# Patient Record
Sex: FEMALE | Race: WHITE | Employment: UNEMPLOYED | URBAN - METROPOLITAN AREA
[De-identification: names, ages, dates, MRNs, and addresses within clinical notes are randomized per-mention and may not be internally consistent; named-entity substitution may affect disease eponyms.]

---

## 2017-02-06 RX ORDER — CITALOPRAM 20 MG/1
TABLET ORAL
Qty: 30 TABLET | Refills: 1 | Status: SHIPPED | OUTPATIENT
Start: 2017-02-06 | End: 2017-04-08 | Stop reason: SDUPTHER

## 2017-03-03 RX ORDER — SUMATRIPTAN 50 MG/1
TABLET, FILM COATED ORAL
Qty: 9 TABLET | Refills: 0 | Status: SHIPPED | OUTPATIENT
Start: 2017-03-03 | End: 2017-04-02 | Stop reason: SDUPTHER

## 2017-03-17 ENCOUNTER — OFFICE VISIT (OUTPATIENT)
Dept: FAMILY MEDICINE CLINIC | Age: 22
End: 2017-03-17
Payer: MEDICARE

## 2017-03-17 VITALS
BODY MASS INDEX: 21.18 KG/M2 | DIASTOLIC BLOOD PRESSURE: 84 MMHG | HEIGHT: 69 IN | WEIGHT: 143 LBS | SYSTOLIC BLOOD PRESSURE: 110 MMHG | OXYGEN SATURATION: 98 % | HEART RATE: 86 BPM | TEMPERATURE: 99.6 F

## 2017-03-17 DIAGNOSIS — B35.3 TINEA PEDIS OF BOTH FEET: Primary | ICD-10-CM

## 2017-03-17 PROCEDURE — 99213 OFFICE O/P EST LOW 20 MIN: CPT | Performed by: NURSE PRACTITIONER

## 2017-03-17 RX ORDER — CLOTRIMAZOLE 1 %
CREAM (GRAM) TOPICAL
Qty: 1 TUBE | Refills: 0 | Status: SHIPPED | OUTPATIENT
Start: 2017-03-17 | End: 2017-03-24

## 2017-03-17 ASSESSMENT — ENCOUNTER SYMPTOMS
COUGH: 0
VOMITING: 0
SHORTNESS OF BREATH: 0
SORE THROAT: 0
DIARRHEA: 0
NAIL CHANGES: 0
EYE PAIN: 0
RHINORRHEA: 0

## 2017-04-03 RX ORDER — SUMATRIPTAN 50 MG/1
TABLET, FILM COATED ORAL
Qty: 9 TABLET | Refills: 0 | Status: SHIPPED | OUTPATIENT
Start: 2017-04-03 | End: 2017-05-17 | Stop reason: SDUPTHER

## 2017-04-04 RX ORDER — SUMATRIPTAN 50 MG/1
TABLET, FILM COATED ORAL
Qty: 9 TABLET | Refills: 0 | OUTPATIENT
Start: 2017-04-04

## 2017-05-17 RX ORDER — SUMATRIPTAN 50 MG/1
TABLET, FILM COATED ORAL
Qty: 9 TABLET | Refills: 0 | Status: SHIPPED | OUTPATIENT
Start: 2017-05-17 | End: 2017-06-21 | Stop reason: SDUPTHER

## 2017-06-21 RX ORDER — SUMATRIPTAN 50 MG/1
TABLET, FILM COATED ORAL
Qty: 9 TABLET | Refills: 0 | Status: SHIPPED | OUTPATIENT
Start: 2017-06-21 | End: 2017-07-22 | Stop reason: SDUPTHER

## 2017-07-24 RX ORDER — SUMATRIPTAN 50 MG/1
TABLET, FILM COATED ORAL
Qty: 9 TABLET | Refills: 0 | OUTPATIENT
Start: 2017-07-24

## 2017-07-24 RX ORDER — CITALOPRAM 20 MG/1
TABLET ORAL
Qty: 30 TABLET | Refills: 2 | OUTPATIENT
Start: 2017-07-24

## 2017-07-24 RX ORDER — SUMATRIPTAN 50 MG/1
TABLET, FILM COATED ORAL
Qty: 9 TABLET | Refills: 0 | Status: SHIPPED | OUTPATIENT
Start: 2017-07-24 | End: 2017-08-22 | Stop reason: SDUPTHER

## 2017-08-22 RX ORDER — CITALOPRAM 20 MG/1
20 TABLET ORAL DAILY
Qty: 30 TABLET | Refills: 0 | Status: SHIPPED | OUTPATIENT
Start: 2017-08-22 | End: 2017-09-26 | Stop reason: SDUPTHER

## 2017-08-22 RX ORDER — SUMATRIPTAN 50 MG/1
50 TABLET, FILM COATED ORAL
Qty: 9 TABLET | Refills: 0 | Status: SHIPPED | OUTPATIENT
Start: 2017-08-22 | End: 2017-10-03 | Stop reason: SDUPTHER

## 2017-09-26 RX ORDER — CITALOPRAM 20 MG/1
20 TABLET ORAL DAILY
Qty: 30 TABLET | Refills: 0 | Status: SHIPPED | OUTPATIENT
Start: 2017-09-26 | End: 2017-10-03 | Stop reason: SDUPTHER

## 2017-10-03 ENCOUNTER — OFFICE VISIT (OUTPATIENT)
Dept: FAMILY MEDICINE CLINIC | Age: 22
End: 2017-10-03
Payer: MEDICARE

## 2017-10-03 VITALS
TEMPERATURE: 98.4 F | HEIGHT: 69 IN | WEIGHT: 145.3 LBS | HEART RATE: 91 BPM | DIASTOLIC BLOOD PRESSURE: 82 MMHG | OXYGEN SATURATION: 99 % | BODY MASS INDEX: 21.52 KG/M2 | SYSTOLIC BLOOD PRESSURE: 114 MMHG

## 2017-10-03 DIAGNOSIS — F34.1 DYSTHYMIA: ICD-10-CM

## 2017-10-03 DIAGNOSIS — G43.009 MIGRAINE WITHOUT AURA AND WITHOUT STATUS MIGRAINOSUS, NOT INTRACTABLE: Primary | ICD-10-CM

## 2017-10-03 DIAGNOSIS — G89.29 CHRONIC BILATERAL BACK PAIN, UNSPECIFIED BACK LOCATION: ICD-10-CM

## 2017-10-03 DIAGNOSIS — M54.9 CHRONIC BILATERAL BACK PAIN, UNSPECIFIED BACK LOCATION: ICD-10-CM

## 2017-10-03 PROCEDURE — 99214 OFFICE O/P EST MOD 30 MIN: CPT | Performed by: NURSE PRACTITIONER

## 2017-10-03 RX ORDER — SUMATRIPTAN 50 MG/1
50 TABLET, FILM COATED ORAL
Qty: 9 TABLET | Refills: 1 | Status: SHIPPED | OUTPATIENT
Start: 2017-10-03 | End: 2017-10-26 | Stop reason: SDUPTHER

## 2017-10-03 RX ORDER — CITALOPRAM 20 MG/1
20 TABLET ORAL DAILY
Qty: 30 TABLET | Refills: 3 | Status: SHIPPED | OUTPATIENT
Start: 2017-10-03 | End: 2018-03-09 | Stop reason: SDUPTHER

## 2017-10-03 ASSESSMENT — ENCOUNTER SYMPTOMS
BLOOD IN STOOL: 0
COUGH: 0
ABDOMINAL PAIN: 0
EYE DISCHARGE: 0
BACK PAIN: 1
SHORTNESS OF BREATH: 0

## 2017-10-03 ASSESSMENT — PATIENT HEALTH QUESTIONNAIRE - PHQ9
SUM OF ALL RESPONSES TO PHQ9 QUESTIONS 1 & 2: 0
SUM OF ALL RESPONSES TO PHQ QUESTIONS 1-9: 0
1. LITTLE INTEREST OR PLEASURE IN DOING THINGS: 0
2. FEELING DOWN, DEPRESSED OR HOPELESS: 0

## 2017-10-03 NOTE — MR AVS SNAPSHOT
After Visit Summary             Yadi Conley   10/3/2017 2:15 PM   Office Visit    Description:  Female : 1995   Provider:  Bambi Stoll NP   Department:  [facility] and Future Appointments         Below is a list of your follow-up and future appointments. This may not be a complete list as you may have made appointments directly with providers that we are not aware of or your providers may have made some for you. Please call your providers to confirm appointments. It is important to keep your appointments. Please bring your current insurance card, photo ID, co-pay, and all medication bottles to your appointment. If self-pay, payment is expected at the time of service. Your To-Do List     Future Appointments Provider Department Dept Phone    2017 9:00 AM Bambi Stoll NP [facility] [phone]    4/3/2018 8:30 AM Bambi Stoll NP [facility] [phone]    Please arrive 15 minutes prior to appointment, bring photo ID and insurance card. Follow-Up    Return in about 6 months (around 4/3/2018) for Depression. Information from Your Visit        Department     Name Address Phone Fax    [facility] [address] [place], [place] [id number] [phone]      You Were Seen for:         Comments    Migraine without aura and without status migrainosus, not intractable   [[id number]]         Vital Signs     Blood Pressure Pulse Temperature Height Weight Last Menstrual Period    114/82 91 98.4 °F (36.9 °C) (Oral) 5' 9\" (1.753 m) 145 lb 4.8 oz (65.9 kg) 2017 (Exact Date)    Oxygen Saturation Body Mass Index Smoking Status             99% 21.46 kg/m2 Former Smoker         Instructions         Painful Sex: Care Instructions  Your Care Instructions  Painful sex can be caused by many things.  You may have an injury, an account. Enter P196 in the [facility] box to learn more about \"Painful Sex: Care Instructions. \"     If you do not have an account, please click on the \"Sign Up Now\" link. Current as of: October 13, 2016  Content Version: 11.3  © 6031-4703 [employer]. Care instructions adapted under license by [facility] ([place] [facility]). If you have questions about a medical condition or this instruction, always ask your healthcare professional. [address] any warranty or liability for your use of this information.               Where to Get Your Medications      These medications were sent to [facility] [id number] [facility], [address] [facility]  SKYLAR [facility], [address] [address]     Phone:  [phone]     citalopram 20 MG tablet    SUMAtriptan 50 MG tablet         Your Current Medications Are              citalopram (CELEXA) 20 MG tablet Take 1 tablet by mouth daily    SUMAtriptan (IMITREX) 50 MG tablet Take 1 tablet by mouth once as needed for Migraine    norgestrel-ethinyl estradiol (ELINEST) 0.3-30 MG-MCG per tablet Take 1 tablet by mouth daily    cyclobenzaprine (FLEXERIL) 10 MG tablet Take 10 mg by mouth 3 times daily as needed for Muscle spasms    naproxen (NAPROSYN) 500 MG tablet Take 1 tablet by mouth 2 times daily (with meals)      Allergies           No Known Allergies         Additional Information        Basic Information     Date Of Birth Sex Race Ethnicity Preferred Language    1995 Female White Non-/Non  English      Problem List as of 10/3/2017  Date Reviewed: 10/3/2017                Chronic bilateral back pain    Internal hemorrhoid    Migraine without aura and without status migrainosus, not intractable    Dysthymia    Family planning, BCP (birth control pills) maintenance    Rectal bleeding    Routine Papanicolaou smear    Back pain      Immunizations as of 10/3/2017     Name Date HPV Gardasil Quadrivalent 2/5/2010, 9/21/2009, 7/20/2009    Tdap (Boostrix, Adacel) 11/4/2016      Preventive Care        Date Due    HIV screening is recommended for all people regardless of risk factors  aged 15-65 years at least once (lifetime) who have never been HIV tested. 5/19/2008    Yearly Flu Vaccine (1) 11/4/2017 (Originally 9/1/2017)    Pap Smear 9/29/2019    Colonoscopy 10/12/2026    Tetanus Combination Vaccine (2 - Td) 11/4/2026            [employer] Signup           Our records indicate that you have an active [employer] account. You can view your After Visit Summary by going to [email or web address] and logging in with your [employer] username and password. If you don't have a [employer] username and password but a parent or guardian has access to your record, the parent or guardian should login with their own [employer] username and password and access your record to view the After Visit Summary. Additional Information  If you have questions, please contact the physician practice where you receive care. Remember, [employer] is NOT to be used for urgent needs. For medical emergencies, dial 911. For questions regarding your [employer] account call [phone]. If you have a clinical question, please call your doctor's office.

## 2017-10-03 NOTE — PROGRESS NOTES
[facility] [address] [place] [id number]  Dept: [phone]  Dept Fax: [phone]    Kvng Shaw is a 23 y.o. female who presents today for her medical conditions/complaints as noted below.   Kvng Shaw is c/o of Medication Refill and Medication Check        HPI:     HPI Comments: Patient presents with:  Medication Refill, feels much better on celexa .using imtrex for migraines   Medication Check  Got injections in back I [facility] PM ; they want to look for underlying issues , believes OA/poss slipped disk, avoidance of steroid to avoid Osteopor        Past Medical History:   Diagnosis Date    Chronic back pain     Contact dermatitis and other eczema due to other chemical products     Contact dermatitis and other eczema due to other chemical products     Depression     Displacement of lumbar intervertebral disc without myelopathy     Dizziness and giddiness     Other general counseling and advice for contraceptive management     Other malaise and fatigue     Syncope and collapse       Past Surgical History:   Procedure Laterality Date    COLONOSCOPY  10/12/2016    internal hemorrhoids and mild proctitis        Family History   Problem Relation Age of Onset    Cancer Father      leukemia       Social History   Substance Use Topics    Smoking status: Former Smoker     Quit date: 10/1/2015    Smokeless tobacco: Never Used    Alcohol use 0.0 oz/week     0 Standard drinks or equivalent per week      Comment: occ      Current Outpatient Prescriptions   Medication Sig Dispense Refill    citalopram (CELEXA) 20 MG tablet Take 1 tablet by mouth daily 30 tablet 3    SUMAtriptan (IMITREX) 50 MG tablet Take 1 tablet by mouth once as needed for Migraine 9 tablet 1    norgestrel-ethinyl estradiol (ELINEST) 0.3-30 MG-MCG per tablet Take 1 tablet by mouth daily 1 packet 11    cyclobenzaprine (FLEXERIL) 10 MG tablet Take 10 mg by mouth 3 times daily as needed for Muscle spasms      naproxen (NAPROSYN) 500 MG tablet Take 1 tablet by mouth 2 times daily (with meals) 60 tablet 3     No current facility-administered medications for this visit. No Known Allergies      Subjective:      Review of Systems   Constitutional: Negative for activity change, chills, fatigue and fever. Eyes: Negative for discharge and visual disturbance. Respiratory: Negative for cough and shortness of breath. Cardiovascular: Negative for chest pain and leg swelling. Gastrointestinal: Negative for abdominal pain and blood in stool. Endocrine: Negative for cold intolerance and heat intolerance. Genitourinary: Negative for dysuria and flank pain. Musculoskeletal: Positive for back pain (sees PM; gets injections ). Negative for joint swelling and myalgias. Skin: Negative for pallor and rash. Neurological: Positive for headaches (assoc with periods ). Negative for dizziness. Psychiatric/Behavioral: Positive for dysphoric mood (controlled with med ). Negative for hallucinations and suicidal ideas. Objective:     Physical Exam   Constitutional: She is oriented to person, place, and time. She appears well-developed and well-nourished. /82  Pulse 91  Temp 98.4 °F (36.9 °C) (Oral)   Ht 5' 9\" (1.753 m)  Wt 145 lb 4.8 oz (65.9 kg)  LMP 09/19/2017 (Exact Date)  SpO2 99%  BMI 21.46 kg/m2     HENT:   Head: Normocephalic and atraumatic. Eyes: Conjunctivae and EOM are normal. No scleral icterus. Neck: Neck supple. Cardiovascular: Normal rate, regular rhythm, normal heart sounds and intact distal pulses. Pulmonary/Chest: Effort normal and breath sounds normal. She has no wheezes. She has no rales. Abdominal: Soft. Bowel sounds are normal.   Musculoskeletal: Normal range of motion. She exhibits no edema. Neurological: She is alert and oriented to person, place, and time. Skin: Skin is warm and dry. Psychiatric: She has a normal mood and affect.

## 2017-10-12 DIAGNOSIS — Z30.41 FAMILY PLANNING, BCP (BIRTH CONTROL PILLS) MAINTENANCE: ICD-10-CM

## 2017-10-12 NOTE — TELEPHONE ENCOUNTER
Next Visit Date:  Future Appointments  Date Time Provider Morgan Trujilloi   11/7/2017 9:00 AM ANDREE Dougherty Refugio [facility]   4/3/2018 8:30 AM Rashad Rudd NP [address] Maintenance   Topic Date Due    HIV screen  05/19/2008    Chlamydia screen  09/29/2017    Flu vaccine (1) 11/04/2017 (Originally 9/1/2017)    Cervical cancer screen  09/29/2019    Colon cancer screen colonoscopy  10/12/2026    DTaP/Tdap/Td vaccine (2 - Td) 11/04/2026       No results found for: LABA1C          ( goal A1C is < 7)   No results found for: LABMICR  No results found for: LDLCHOLESTEROL, LDLCALC    (goal LDL is <100)   AST (U/L)   Date Value   09/22/2016 22     ALT (U/L)   Date Value   09/22/2016 18     BUN (mg/dL)   Date Value   09/22/2016 12     BP Readings from Last 3 Encounters:   10/03/17 114/82   03/17/17 110/84   11/04/16 108/70          (goal 120/80)    All Future Testing planned in [facility]  Lab Frequency Next Occurrence   GYN Cytology Once 09/29/2018   VAGINITIS DNA PROBE Once 09/29/2018   C. Trachomatis / N.  Gonorrhoeae, DNA Probe Once 09/29/2018               Patient Active Problem List:     Back pain     Routine Papanicolaou smear     Rectal bleeding     Internal hemorrhoid     Migraine without aura and without status migrainosus, not intractable     Dysthymia     Family planning, BCP (birth control pills) maintenance     Chronic bilateral back pain

## 2017-10-16 RX ORDER — NORGESTREL AND ETHINYL ESTRADIOL 0.3-0.03MG
KIT ORAL
Qty: 28 TABLET | Refills: 10 | Status: SHIPPED | OUTPATIENT
Start: 2017-10-16 | End: 2018-08-07 | Stop reason: SDUPTHER

## 2017-10-26 DIAGNOSIS — G43.009 MIGRAINE WITHOUT AURA AND WITHOUT STATUS MIGRAINOSUS, NOT INTRACTABLE: ICD-10-CM

## 2017-10-26 DIAGNOSIS — F34.1 DYSTHYMIA: ICD-10-CM

## 2017-10-26 NOTE — TELEPHONE ENCOUNTER
From: Patricia Estrada  Sent: 10/26/2017 8:59 AM EDT  Subject: Medication Renewal Request    Patricia Estrada would like a refill of the following medications:  citalopram (CELEXA) 20 MG tablet Irving Orozco NP]  SUMAtriptan (IMITREX) 50 MG tablet Irving Orozco NP]    Preferred pharmacy: [address] [place]    Comment:

## 2017-10-27 RX ORDER — SUMATRIPTAN 50 MG/1
50 TABLET, FILM COATED ORAL
Qty: 9 TABLET | Refills: 0 | Status: SHIPPED | OUTPATIENT
Start: 2017-10-27 | End: 2018-01-29 | Stop reason: SDUPTHER

## 2017-10-27 RX ORDER — CITALOPRAM 20 MG/1
20 TABLET ORAL DAILY
Qty: 30 TABLET | Refills: 3 | OUTPATIENT
Start: 2017-10-27

## 2017-11-02 DIAGNOSIS — Z30.41 FAMILY PLANNING, BCP (BIRTH CONTROL PILLS) MAINTENANCE: ICD-10-CM

## 2017-11-02 DIAGNOSIS — G43.009 MIGRAINE WITHOUT AURA AND WITHOUT STATUS MIGRAINOSUS, NOT INTRACTABLE: ICD-10-CM

## 2017-11-02 NOTE — TELEPHONE ENCOUNTER
From: Mega Womack  Sent: 11/2/2017 9:02 AM EDT  Subject: Medication Renewal Request    Mega Womack would like a refill of the following medications:  ELINEST 0.3-30 MG-MCG per tablet Mandi Barajas NP]  SUMAtriptan (IMITREX) 50 MG tablet Mandi Barajas NP]    Preferred pharmacy: [facility] [address]    Comment:

## 2017-11-03 RX ORDER — SUMATRIPTAN 50 MG/1
50 TABLET, FILM COATED ORAL
Qty: 9 TABLET | Refills: 0 | OUTPATIENT
Start: 2017-11-03 | End: 2017-11-03

## 2017-11-21 ENCOUNTER — HOSPITAL ENCOUNTER (OUTPATIENT)
Age: 22
Setting detail: SPECIMEN
Discharge: HOME | End: 2017-11-21
Payer: MEDICARE

## 2017-11-21 ENCOUNTER — OFFICE VISIT (OUTPATIENT)
Dept: FAMILY MEDICINE CLINIC | Age: 22
End: 2017-11-21
Payer: MEDICARE

## 2017-11-21 VITALS
BODY MASS INDEX: 20.73 KG/M2 | DIASTOLIC BLOOD PRESSURE: 80 MMHG | OXYGEN SATURATION: 99 % | SYSTOLIC BLOOD PRESSURE: 111 MMHG | HEIGHT: 69 IN | TEMPERATURE: 99 F | WEIGHT: 140 LBS | HEART RATE: 86 BPM

## 2017-11-21 DIAGNOSIS — N89.8 VAGINAL DISCHARGE: Primary | ICD-10-CM

## 2017-11-21 DIAGNOSIS — Z12.4 CERVICAL CANCER SCREENING: ICD-10-CM

## 2017-11-21 DIAGNOSIS — N89.8 VAGINAL DISCHARGE: ICD-10-CM

## 2017-11-21 LAB
DIRECT EXAM: ABNORMAL
Lab: ABNORMAL
SPECIMEN DESCRIPTION: ABNORMAL
STATUS: ABNORMAL

## 2017-11-21 PROCEDURE — 99395 PREV VISIT EST AGE 18-39: CPT | Performed by: NURSE PRACTITIONER

## 2017-11-21 NOTE — PROGRESS NOTES
allergies indicates no known allergies. Patient Active Problem List   Diagnosis    Back pain    Routine Papanicolaou smear    Rectal bleeding    Internal hemorrhoid    Migraine without aura and without status migrainosus, not intractable    Dysthymia    Family planning, BCP (birth control pills) maintenance    Chronic bilateral back pain       REVIEW OF SYSTEMS:        A minimum of an eleven point review of systems was completed and found to be negative except for the pertinent positives found below. Constitutional: No fever, chills or malaise;  fatigue  Head and Eyes: No  Headache, Dizziness or trauma in last 12 months  ENT ROS: No hearing, Tinnitis, sinus problems  Hematological and Lymphatic ROS : no gland swelling , no h/o Bleeding   Psych ROS: No Depression,  or anxiety  Breast ROS: No prior breast abnormalities or lumps  Respiratory ROS: No SOB,,Cough,   Cardiovascular ROS: No Chest Pain with Exertion, Palpitations, Syncope, Edema, Arrhythmia  Gastrointestinal ROS: No Indigestion, Heartburn, Nausea, vomiting, Diahrea, Constipation,or Bowel Changes; No Bloody Stools or melena  Genito-Urinary ROS: No Dysuria, Hematuria or Nocturia. No Urinary Incontinence o+ daily clear Vaginal Discharge. Dryness and pain with intercourse, t/o history of sexual intercourse .    Musculoskeletal ROS: No Arthralgia,or Rheumatism  Neurological ROS: No CVA, Migraines, Epilepsy, Seizure Hx, or Limb Weakness  Dermatological ROS: No Rash, Itching, Hives, Mole Changes                                                                                                                                                                                                                           PHYSICAL Exam:     Constitutional:  Blood pressure 111/80, pulse 86, temperature 99 °F (37.2 °C), temperature source Oral, height 5' 9\" (1.753 m), weight 140 lb (63.5 kg), last menstrual period 11/07/2017, SpO2 99 %, not currently breastfeeding. General Appearance: This  is a well Developed, well Nourished, well groomed female. Her BMI was reviewed. Skin:  There was a Normal Inspection of the skin without rashes or lesions. There were no rashes. (Papular, Maculopapular, Hives, Pustular, Macular)     There were no lesions (Ulcers, Erythema, Abn. Appearing Nevi)        Lymphatic:  No Lymph Nodes were Palpable in the neck , axilla        Respiratory: The lungs were auscultated and found to be clear. There were no rales, rhonchi or wheezes. There was a good respiratory effort. Cardiovascular: The heart was in a regular rate and rhythm. . No S3 or S4. There was no murmur appreciated. Location, grade, and radiation are not applicable. Extremities: The patients extremities were without edema,   There was full range of motion in all four extremities. Abdomen: The abdomen was soft and non-tender. There were good bowel sounds in all quadrants and   there was no guarding, rebound or rigidity. On evaluation there was no evidence of hepatosplenomegaly   No hernias were appreciated. Abdominal Scars: n/a     Psych: The patient had a normal Orientation to: Time, Place, Person, and Situation  There is no Mood / Affect changes    Breast:  (Chest)  normal appearance, no masses or tenderness, Inspection negative, No nipple retraction or dimpling, No nipple discharge or bleeding, No axillary or supraclavicular adenopathy, Normal to palpation without dominant masses, Taught monthly breast self examination  Self breast exams were reviewed in detail. Pelvic Exam :    External genitalia: Sesser eschucheon, no lesions. Vagina: Rugated,pink,  no odor, good tone , discharge noted. Cervix: no CMT, non friable , patent os with MODERATE amt white  discharge, no lesions     Uterus: Small, mobile, midline, anteverted, non-tender. Adnexae: No masses or tenderness bilaterally.       Neuromuscular  A

## 2017-11-22 ENCOUNTER — TELEPHONE (OUTPATIENT)
Dept: INTERNAL MEDICINE CLINIC | Age: 22
End: 2017-11-22

## 2017-11-22 LAB
C TRACH DNA GENITAL QL NAA+PROBE: NEGATIVE
N. GONORRHOEAE DNA: NEGATIVE

## 2017-11-22 RX ORDER — FLUCONAZOLE 150 MG/1
150 TABLET ORAL ONCE
Qty: 2 TABLET | Refills: 0 | Status: SHIPPED | OUTPATIENT
Start: 2017-11-22 | End: 2017-11-22

## 2017-11-30 LAB — CYTOLOGY REPORT: NORMAL

## 2018-01-15 ENCOUNTER — OFFICE VISIT (OUTPATIENT)
Dept: FAMILY MEDICINE CLINIC | Age: 23
End: 2018-01-15
Payer: MEDICARE

## 2018-01-15 VITALS
TEMPERATURE: 98.6 F | BODY MASS INDEX: 21.8 KG/M2 | HEIGHT: 69 IN | SYSTOLIC BLOOD PRESSURE: 126 MMHG | DIASTOLIC BLOOD PRESSURE: 86 MMHG | WEIGHT: 147.19 LBS

## 2018-01-15 DIAGNOSIS — R52 BODY ACHES: ICD-10-CM

## 2018-01-15 DIAGNOSIS — Z20.828 EXPOSURE TO INFLUENZA: ICD-10-CM

## 2018-01-15 DIAGNOSIS — J11.1 INFLUENZA-LIKE ILLNESS: Primary | ICD-10-CM

## 2018-01-15 DIAGNOSIS — J02.9 SORE THROAT: ICD-10-CM

## 2018-01-15 LAB
INFLUENZA A ANTIBODY: NEGATIVE
INFLUENZA B ANTIBODY: NEGATIVE
S PYO AG THROAT QL: NORMAL

## 2018-01-15 PROCEDURE — 99213 OFFICE O/P EST LOW 20 MIN: CPT | Performed by: NURSE PRACTITIONER

## 2018-01-15 PROCEDURE — G8427 DOCREV CUR MEDS BY ELIG CLIN: HCPCS | Performed by: NURSE PRACTITIONER

## 2018-01-15 PROCEDURE — G8420 CALC BMI NORM PARAMETERS: HCPCS | Performed by: NURSE PRACTITIONER

## 2018-01-15 PROCEDURE — G8484 FLU IMMUNIZE NO ADMIN: HCPCS | Performed by: NURSE PRACTITIONER

## 2018-01-15 PROCEDURE — 87880 STREP A ASSAY W/OPTIC: CPT | Performed by: NURSE PRACTITIONER

## 2018-01-15 PROCEDURE — 87804 INFLUENZA ASSAY W/OPTIC: CPT | Performed by: NURSE PRACTITIONER

## 2018-01-15 PROCEDURE — 1036F TOBACCO NON-USER: CPT | Performed by: NURSE PRACTITIONER

## 2018-01-15 RX ORDER — BENZONATATE 100 MG/1
100 CAPSULE ORAL 3 TIMES DAILY PRN
Qty: 30 CAPSULE | Refills: 0 | Status: SHIPPED | OUTPATIENT
Start: 2018-01-15 | End: 2018-01-22

## 2018-01-15 RX ORDER — OSELTAMIVIR PHOSPHATE 75 MG/1
75 CAPSULE ORAL 2 TIMES DAILY
Qty: 10 CAPSULE | Refills: 0 | Status: SHIPPED | OUTPATIENT
Start: 2018-01-15 | End: 2018-01-20

## 2018-01-15 ASSESSMENT — ENCOUNTER SYMPTOMS
SHORTNESS OF BREATH: 0
SINUS PAIN: 0
NAUSEA: 1
DIARRHEA: 0
SORE THROAT: 1
RHINORRHEA: 1
CHEST TIGHTNESS: 0
ABDOMINAL PAIN: 0
WHEEZING: 0
SINUS PRESSURE: 0
COUGH: 1
VOMITING: 0

## 2018-01-15 NOTE — PROGRESS NOTES
Visit Information    Have you changed or started any medications since your last visit including any over-the-counter medicines, vitamins, or herbal medicines? no   Are you having any side effects from any of your medications? -  no  Have you stopped taking any of your medications? Is so, why? -  no    Have you seen any other physician or provider since your last visit? No  Have you had any other diagnostic tests since your last visit? No  Have you been seen in the emergency room and/or had an admission to a hospital since we last saw you? No  Have you had your routine dental cleaning in the past 6 months? no    Have you activated your [employer] account? If not, what are your barriers?  Yes     Patient Care Team:  Keenan Simeon NP as PCP - General (Certified Nurse Practitioner)  Kate Wilson MD as Consulting Physician (Gastroenterology)    Medical History Review  Past Medical, Family, and Social History reviewed and does not contribute to the patient presenting condition    Health Maintenance   Topic Date Due    HIV screen  05/19/2008    Flu vaccine (1) 11/21/2018 (Originally 9/1/2017)    Chlamydia screen  11/21/2018    Cervical cancer screen  11/21/2020    Colon cancer screen colonoscopy  10/12/2026    DTaP/Tdap/Td vaccine (2 - Td) 11/04/2026
MG-MCG per tablet TAKE 1 TABLET BY MOUTH ONE TIME A DAY  28 tablet 10    citalopram (CELEXA) 20 MG tablet Take 1 tablet by mouth daily 30 tablet 3    cyclobenzaprine (FLEXERIL) 10 MG tablet Take 10 mg by mouth 3 times daily as needed for Muscle spasms      naproxen (NAPROSYN) 500 MG tablet Take 1 tablet by mouth 2 times daily (with meals) 60 tablet 3    SUMAtriptan (IMITREX) 50 MG tablet Take 1 tablet by mouth once as needed for Migraine 9 tablet 0     No current facility-administered medications for this visit. No Known Allergies    Reviewed PMH, SH, and FH with the patient and updated. Subjective:      Review of Systems   Constitutional: Positive for chills and fatigue. Negative for diaphoresis. Fever: has not been measured at home. HENT: Positive for ear pain (right ), mouth sores, postnasal drip, rhinorrhea and sore throat. Negative for congestion, sinus pain and sinus pressure. Respiratory: Positive for cough (nonproductive). Negative for chest tightness, shortness of breath and wheezing. Cardiovascular: Negative for chest pain and palpitations. Gastrointestinal: Positive for nausea. Negative for abdominal pain, diarrhea and vomiting. Musculoskeletal: Positive for myalgias. Skin: Negative for rash. Neurological: Positive for headaches (chronic). Negative for dizziness and light-headedness. Objective:     Physical Exam   Constitutional: She is oriented to person, place, and time. She appears well-developed and well-nourished. No distress. Ill appearing   HENT:   Head: Normocephalic. Right Ear: Tympanic membrane, external ear and ear canal normal.   Left Ear: Tympanic membrane, external ear and ear canal normal.   Nose: Nose normal. Right sinus exhibits no maxillary sinus tenderness and no frontal sinus tenderness. Left sinus exhibits no maxillary sinus tenderness and no frontal sinus tenderness.    Mouth/Throat: Uvula is midline and mucous membranes are normal. Posterior

## 2018-01-29 ENCOUNTER — OFFICE VISIT (OUTPATIENT)
Dept: FAMILY MEDICINE CLINIC | Age: 23
End: 2018-01-29
Payer: MEDICARE

## 2018-01-29 VITALS
WEIGHT: 148 LBS | BODY MASS INDEX: 21.92 KG/M2 | OXYGEN SATURATION: 99 % | DIASTOLIC BLOOD PRESSURE: 82 MMHG | TEMPERATURE: 99 F | SYSTOLIC BLOOD PRESSURE: 126 MMHG | HEIGHT: 69 IN | HEART RATE: 98 BPM

## 2018-01-29 DIAGNOSIS — R68.83 CHILLS: ICD-10-CM

## 2018-01-29 DIAGNOSIS — R52 BODY ACHES: ICD-10-CM

## 2018-01-29 DIAGNOSIS — G43.009 MIGRAINE WITHOUT AURA AND WITHOUT STATUS MIGRAINOSUS, NOT INTRACTABLE: ICD-10-CM

## 2018-01-29 DIAGNOSIS — J02.9 SORE THROAT: ICD-10-CM

## 2018-01-29 DIAGNOSIS — J06.9 UPPER RESPIRATORY TRACT INFECTION, UNSPECIFIED TYPE: Primary | ICD-10-CM

## 2018-01-29 LAB
INFLUENZA A ANTIBODY: NORMAL
INFLUENZA B ANTIBODY: NORMAL
S PYO AG THROAT QL: NORMAL

## 2018-01-29 PROCEDURE — G8420 CALC BMI NORM PARAMETERS: HCPCS | Performed by: NURSE PRACTITIONER

## 2018-01-29 PROCEDURE — G8427 DOCREV CUR MEDS BY ELIG CLIN: HCPCS | Performed by: NURSE PRACTITIONER

## 2018-01-29 PROCEDURE — 99213 OFFICE O/P EST LOW 20 MIN: CPT | Performed by: NURSE PRACTITIONER

## 2018-01-29 PROCEDURE — 1036F TOBACCO NON-USER: CPT | Performed by: NURSE PRACTITIONER

## 2018-01-29 PROCEDURE — 87880 STREP A ASSAY W/OPTIC: CPT | Performed by: NURSE PRACTITIONER

## 2018-01-29 PROCEDURE — G8484 FLU IMMUNIZE NO ADMIN: HCPCS | Performed by: NURSE PRACTITIONER

## 2018-01-29 PROCEDURE — 87804 INFLUENZA ASSAY W/OPTIC: CPT | Performed by: NURSE PRACTITIONER

## 2018-01-29 RX ORDER — AZITHROMYCIN 250 MG/1
TABLET, FILM COATED ORAL
Qty: 1 PACKET | Refills: 0 | Status: SHIPPED | OUTPATIENT
Start: 2018-01-29 | End: 2018-02-08

## 2018-01-29 ASSESSMENT — ENCOUNTER SYMPTOMS
SINUS PRESSURE: 0
VOMITING: 0
NAUSEA: 0
WHEEZING: 0
RHINORRHEA: 0
SORE THROAT: 1
SINUS PAIN: 0
DIARRHEA: 0
CHEST TIGHTNESS: 0
COUGH: 1
SHORTNESS OF BREATH: 0
ABDOMINAL PAIN: 0

## 2018-01-29 NOTE — TELEPHONE ENCOUNTER
Next Visit Date:  Future Appointments  Date Time Provider Morgan Bernal   4/3/2018 8:30 AM William Barton NP [address] Maintenance   Topic Date Due    HIV screen  05/19/2008    Flu vaccine (1) 11/21/2018 (Originally 9/1/2017)    Chlamydia screen  11/21/2018    Cervical cancer screen  11/21/2020    Colon cancer screen colonoscopy  10/12/2026    DTaP/Tdap/Td vaccine (2 - Td) 11/04/2026       No results found for: LABA1C          ( goal A1C is < 7)   No results found for: LABMICR  No results found for: LDLCHOLESTEROL, LDLCALC    (goal LDL is <100)   AST (U/L)   Date Value   09/22/2016 22     ALT (U/L)   Date Value   09/22/2016 18     BUN (mg/dL)   Date Value   09/22/2016 12     BP Readings from Last 3 Encounters:   01/29/18 126/82   01/15/18 126/86   11/21/17 111/80          (goal 120/80)    All Future Testing planned in [facility]  Lab Frequency Next Occurrence   GYN Cytology Once 09/29/2018   VAGINITIS DNA PROBE Once 09/29/2018   C. Trachomatis / N.  Gonorrhoeae, DNA Probe Once 09/29/2018   GYN Cytology Once 11/21/2018               Patient Active Problem List:     Back pain     Routine Papanicolaou smear     Rectal bleeding     Internal hemorrhoid     Migraine without aura and without status migrainosus, not intractable     Dysthymia     Family planning, BCP (birth control pills) maintenance     Chronic bilateral back pain

## 2018-01-29 NOTE — PROGRESS NOTES
given educational materials - see patient instructions. Discussed use, benefit, and side effects of prescribed medications. All patient questions answered. Pt voiced understanding.     Electronically signed by Ethan Miller CNP on 1/29/2018 at 5:16 PM

## 2018-01-31 RX ORDER — SUMATRIPTAN 50 MG/1
50 TABLET, FILM COATED ORAL
Qty: 9 TABLET | Refills: 0 | Status: SHIPPED | OUTPATIENT
Start: 2018-01-31 | End: 2018-03-18 | Stop reason: SDUPTHER

## 2018-03-18 DIAGNOSIS — G43.009 MIGRAINE WITHOUT AURA AND WITHOUT STATUS MIGRAINOSUS, NOT INTRACTABLE: ICD-10-CM

## 2018-03-19 RX ORDER — SUMATRIPTAN 50 MG/1
TABLET, FILM COATED ORAL
Qty: 9 TABLET | Refills: 0 | Status: SHIPPED | OUTPATIENT
Start: 2018-03-19 | End: 2018-04-17 | Stop reason: SDUPTHER

## 2018-03-19 NOTE — TELEPHONE ENCOUNTER
Next Visit Date:  Future Appointments  Date Time Provider Morgan Bernal   4/3/2018 8:30 AM Jesse Savage NP [address] Maintenance   Topic Date Due    HIV screen  05/19/2008    Flu vaccine (1) 11/21/2018 (Originally 9/1/2017)    Chlamydia screen  11/21/2018    Cervical cancer screen  11/21/2020    Colon cancer screen colonoscopy  10/12/2026    DTaP/Tdap/Td vaccine (2 - Td) 11/04/2026       No results found for: LABA1C          ( goal A1C is < 7)   No results found for: LABMICR  No results found for: LDLCHOLESTEROL, LDLCALC    (goal LDL is <100)   AST (U/L)   Date Value   09/22/2016 22     ALT (U/L)   Date Value   09/22/2016 18     BUN (mg/dL)   Date Value   09/22/2016 12     BP Readings from Last 3 Encounters:   01/29/18 126/82   01/15/18 126/86   11/21/17 111/80          (goal 120/80)    All Future Testing planned in [facility]  Lab Frequency Next Occurrence   GYN Cytology Once 09/29/2018   VAGINITIS DNA PROBE Once 09/29/2018   C. Trachomatis / N.  Gonorrhoeae, DNA Probe Once 09/29/2018   GYN Cytology Once 11/21/2018               Patient Active Problem List:     Back pain     Routine Papanicolaou smear     Rectal bleeding     Internal hemorrhoid     Migraine without aura and without status migrainosus, not intractable     Dysthymia     Family planning, BCP (birth control pills) maintenance     Chronic bilateral back pain

## 2018-04-03 DIAGNOSIS — F34.1 DYSTHYMIA: ICD-10-CM

## 2018-04-03 RX ORDER — CITALOPRAM 20 MG/1
TABLET ORAL
Qty: 30 TABLET | Refills: 0 | OUTPATIENT
Start: 2018-04-03

## 2018-04-17 ENCOUNTER — OFFICE VISIT (OUTPATIENT)
Dept: FAMILY MEDICINE CLINIC | Age: 23
End: 2018-04-17
Payer: COMMERCIAL

## 2018-04-17 ENCOUNTER — TELEPHONE (OUTPATIENT)
Dept: FAMILY MEDICINE CLINIC | Age: 23
End: 2018-04-17

## 2018-04-17 VITALS
SYSTOLIC BLOOD PRESSURE: 111 MMHG | WEIGHT: 149 LBS | TEMPERATURE: 98.9 F | HEART RATE: 86 BPM | DIASTOLIC BLOOD PRESSURE: 80 MMHG | OXYGEN SATURATION: 98 % | BODY MASS INDEX: 22.07 KG/M2 | HEIGHT: 69 IN

## 2018-04-17 DIAGNOSIS — F34.1 DYSTHYMIA: ICD-10-CM

## 2018-04-17 DIAGNOSIS — N94.10 DYSPAREUNIA IN FEMALE: ICD-10-CM

## 2018-04-17 DIAGNOSIS — G43.009 MIGRAINE WITHOUT AURA AND WITHOUT STATUS MIGRAINOSUS, NOT INTRACTABLE: ICD-10-CM

## 2018-04-17 PROCEDURE — 99214 OFFICE O/P EST MOD 30 MIN: CPT | Performed by: NURSE PRACTITIONER

## 2018-04-17 PROCEDURE — 1036F TOBACCO NON-USER: CPT | Performed by: NURSE PRACTITIONER

## 2018-04-17 PROCEDURE — G8427 DOCREV CUR MEDS BY ELIG CLIN: HCPCS | Performed by: NURSE PRACTITIONER

## 2018-04-17 PROCEDURE — G8420 CALC BMI NORM PARAMETERS: HCPCS | Performed by: NURSE PRACTITIONER

## 2018-04-17 RX ORDER — CITALOPRAM 20 MG/1
20 TABLET ORAL DAILY
Qty: 30 TABLET | Refills: 5 | Status: SHIPPED | OUTPATIENT
Start: 2018-04-17 | End: 2018-10-14 | Stop reason: SDUPTHER

## 2018-04-17 RX ORDER — SUMATRIPTAN 50 MG/1
50 TABLET, FILM COATED ORAL
Qty: 9 TABLET | Refills: 0 | Status: SHIPPED | OUTPATIENT
Start: 2018-04-17 | End: 2018-07-01 | Stop reason: SDUPTHER

## 2018-04-17 ASSESSMENT — ENCOUNTER SYMPTOMS
BLOOD IN STOOL: 0
EYE DISCHARGE: 0
ABDOMINAL PAIN: 0
COUGH: 0
SHORTNESS OF BREATH: 0

## 2018-07-02 DIAGNOSIS — G43.009 MIGRAINE WITHOUT AURA AND WITHOUT STATUS MIGRAINOSUS, NOT INTRACTABLE: ICD-10-CM

## 2018-07-02 RX ORDER — SUMATRIPTAN 50 MG/1
50 TABLET, FILM COATED ORAL
Qty: 9 TABLET | Refills: 0 | OUTPATIENT
Start: 2018-07-02 | End: 2018-07-02

## 2018-07-02 NOTE — TELEPHONE ENCOUNTER
From: Carmin Nageotte  Sent: 7/1/2018 1:06 PM EDT  Subject: Medication Renewal Request    Carmin Nageotte would like a refill of the following medications:     SUMAtriptan (IMITREX) 50 MG tablet Uday Dewey, APRN - CNP]    Preferred pharmacy: [address] [phone]

## 2018-10-14 DIAGNOSIS — F34.1 DYSTHYMIA: ICD-10-CM

## 2018-10-15 RX ORDER — CITALOPRAM 20 MG/1
20 TABLET ORAL DAILY
Qty: 30 TABLET | Refills: 0 | Status: SHIPPED | OUTPATIENT
Start: 2018-10-15 | End: 2018-12-03 | Stop reason: SDUPTHER

## 2018-12-03 ENCOUNTER — TELEPHONE (OUTPATIENT)
Dept: FAMILY MEDICINE CLINIC | Age: 23
End: 2018-12-03

## 2018-12-03 DIAGNOSIS — F34.1 DYSTHYMIA: ICD-10-CM

## 2018-12-03 DIAGNOSIS — F33.1 MAJOR DEPRESSIVE DISORDER, RECURRENT, MODERATE (HCC): ICD-10-CM

## 2018-12-03 RX ORDER — CITALOPRAM 20 MG/1
20 TABLET ORAL DAILY
Qty: 30 TABLET | Refills: 0 | Status: SHIPPED | OUTPATIENT
Start: 2018-12-03 | End: 2018-12-11 | Stop reason: SDUPTHER

## 2018-12-03 NOTE — TELEPHONE ENCOUNTER
Patient was advised to pick her meds
studies have found an association between early pregnancy exposure to citalopram and birth defects [26,47,48], the type of defect varied across studies, which suggests the findings did not represent true effects [10], and that citalopram is not a major teratogen [49]. Pregnancy complications -- Most studies have found that citalopram is not associated with any major pregnancy complications, except for postpartum hemorrhage:  ?Spontaneous  - Multiple studies suggest that citalopram is not associated with spontaneous  [31]. As an example, a national registry study identified pregnant women with depression, and found that the risk of spontaneous  was comparable in women who were treated with citalopram and women who were not exposed to antidepressants [30]. ?Hypertensive disorders of pregnancy - Multiple studies suggest that citalopram is not associated with hypertensive disorders of pregnancy [50]. As an example, a study of an insurance claims database found that among pregnant women with depression, the risk of preeclampsia was comparable for women who were treated with citalopram during the second and third trimesters (n >1000) and women who received no antidepressants (n >59,000) [33]. ?Postpartum hemorrhage - A study of pregnant women with mood or anxiety disorders in a nationwide insurance claims database found that use of citalopram at the time of delivery (n >800) was associated with postpartum hemorrhage (relative risk 1.5, 95% CI 1.1-2.0) [35]. )

## 2018-12-11 ENCOUNTER — OFFICE VISIT (OUTPATIENT)
Dept: FAMILY MEDICINE CLINIC | Age: 23
End: 2018-12-11
Payer: COMMERCIAL

## 2018-12-11 VITALS
HEIGHT: 69 IN | HEART RATE: 87 BPM | WEIGHT: 156.7 LBS | SYSTOLIC BLOOD PRESSURE: 116 MMHG | OXYGEN SATURATION: 99 % | TEMPERATURE: 98.7 F | BODY MASS INDEX: 23.21 KG/M2 | DIASTOLIC BLOOD PRESSURE: 64 MMHG

## 2018-12-11 DIAGNOSIS — F33.1 MAJOR DEPRESSIVE DISORDER, RECURRENT, MODERATE (HCC): Primary | ICD-10-CM

## 2018-12-11 DIAGNOSIS — Z3A.19 19 WEEKS GESTATION OF PREGNANCY: ICD-10-CM

## 2018-12-11 PROCEDURE — 99213 OFFICE O/P EST LOW 20 MIN: CPT | Performed by: NURSE PRACTITIONER

## 2018-12-11 PROCEDURE — 1036F TOBACCO NON-USER: CPT | Performed by: NURSE PRACTITIONER

## 2018-12-11 PROCEDURE — G8484 FLU IMMUNIZE NO ADMIN: HCPCS | Performed by: NURSE PRACTITIONER

## 2018-12-11 PROCEDURE — G8420 CALC BMI NORM PARAMETERS: HCPCS | Performed by: NURSE PRACTITIONER

## 2018-12-11 PROCEDURE — G8427 DOCREV CUR MEDS BY ELIG CLIN: HCPCS | Performed by: NURSE PRACTITIONER

## 2018-12-11 RX ORDER — CITALOPRAM 20 MG/1
20 TABLET ORAL
COMMUNITY
Start: 2017-04-13 | End: 2018-12-11

## 2018-12-11 RX ORDER — FAMOTIDINE 10 MG
10 TABLET ORAL 2 TIMES DAILY
COMMUNITY

## 2018-12-11 RX ORDER — CITALOPRAM 20 MG/1
20 TABLET ORAL DAILY
Qty: 30 TABLET | Refills: 1 | Status: SHIPPED | OUTPATIENT
Start: 2018-12-11 | End: 2019-02-25 | Stop reason: SDUPTHER

## 2018-12-11 ASSESSMENT — ENCOUNTER SYMPTOMS
EYE DISCHARGE: 0
SHORTNESS OF BREATH: 0
BLOOD IN STOOL: 0
COUGH: 0
ABDOMINAL PAIN: 0

## 2018-12-11 NOTE — PROGRESS NOTES
major cardiac anomalies was comparable for the two groups. ?A meta-analysis of seven observational studies found that the incidence of congenital malformations in babies who were exposed (n >7000) or not exposed (n >2,300,000) in utero to citalopram was comparable [28]. In addition, the rate of major malformations and the rate of cardiac anomalies were each comparable for the two groups. Although other observational studies have found an association between early pregnancy exposure to citalopram and birth defects [26,47,48], the type of defect varied across studies, which suggests the findings did not represent true effects [10], and that citalopram is not a major teratogen [49]. Pregnancy complications -- Most studies have found that citalopram is not associated with any major pregnancy complications, except for postpartum hemorrhage:  ?Spontaneous  - Multiple studies suggest that citalopram is not associated with spontaneous  [31]. As an example, a national registry study identified pregnant women with depression, and found that the risk of spontaneous  was comparable in women who were treated with citalopram and women who were not exposed to antidepressants [30]. ?Hypertensive disorders of pregnancy - Multiple studies suggest that citalopram is not associated with hypertensive disorders of pregnancy [50]. As an example, a study of an insurance claims database found that among pregnant women with depression, the risk of preeclampsia was comparable for women who were treated with citalopram during the second and third trimesters (n >1000) and women who received no antidepressants (n >59,000) [33]. ?Postpartum hemorrhage - A study of pregnant women with mood or anxiety disorders in a nationwide insurance claims database found that use of citalopram at the time of delivery (n >800) was associated with postpartum hemorrhage (relative risk 1.5, 95% CI 1.1-2.0) [35]. )          Assessment: